# Patient Record
Sex: MALE | Race: WHITE | URBAN - NONMETROPOLITAN AREA
[De-identification: names, ages, dates, MRNs, and addresses within clinical notes are randomized per-mention and may not be internally consistent; named-entity substitution may affect disease eponyms.]

---

## 2022-02-15 ENCOUNTER — OFFICE VISIT (OUTPATIENT)
Dept: URBAN - NONMETROPOLITAN AREA CLINIC 19 | Facility: CLINIC | Age: 72
End: 2022-02-15
Payer: MEDICARE

## 2022-02-15 DIAGNOSIS — H35.3211 EXDTVE AGE-REL MCLR DEGN, RIGHT EYE, WITH ACTV CHRDL NEOVAS: ICD-10-CM

## 2022-02-15 DIAGNOSIS — E11.9 TYPE 2 DIABETES MELLITUS W/O COMPLICATION: ICD-10-CM

## 2022-02-15 DIAGNOSIS — H40.013 OPEN ANGLE WITH BORDERLINE FINDINGS, LOW RISK, BILATERAL: ICD-10-CM

## 2022-02-15 DIAGNOSIS — H43.813 VITREOUS DEGENERATION, BILATERAL: ICD-10-CM

## 2022-02-15 DIAGNOSIS — H35.3122 NEXDTVE AGE-RELATED MCLR DEGN, LEFT EYE, INTERMED DRY STAGE: ICD-10-CM

## 2022-02-15 PROCEDURE — 99204 OFFICE O/P NEW MOD 45 MIN: CPT | Performed by: OPHTHALMOLOGY

## 2022-02-15 ASSESSMENT — INTRAOCULAR PRESSURE
OS: 11
OD: 15

## 2022-02-15 ASSESSMENT — VISUAL ACUITY: OD: CF 1FT

## 2022-02-15 NOTE — IMPRESSION/PLAN
Impression: Vitreous degeneration, bilateral: H43.813. Plan: The patient was found to have a PVD. No signs of retinal tear or retinal detachment were  found on exam.  The mechanism of vitreous degeneration and PVD were discussed. The signs and symptoms of retinal tear and retinal detachment were discussed with the patient, and they were instructed to RCT ASAP if they develop.

## 2022-02-15 NOTE — IMPRESSION/PLAN
Impression: Open angle with borderline findings, low risk, bilateral: H40.013. Plan: The patient is diagnosed as a glaucoma suspect due to CDR . The exam findings relevant to this condition, the potential need for medical and/or surgical treatment, and  the need to monitor for development into definite glaucoma with clinical exams and diagnostic studies was emphasized to the patient. The patient was also educated that untreated glaucoma may lead to permanent vision loss and blindness.   All questions were answered and the patient appeared to understand

## 2022-02-15 NOTE — IMPRESSION/PLAN
Impression: Nexdtve age-related mclr degn, left eye, intermed dry stage: H35.3122. Plan: The patient has retinal findings consistent with dry macular degeneration OS,  I recommended using an Amsler grid daily to check vision, and to call office immediately if change is noted. AREDS vitamins were recommended to help slow the progression and lessen the risk of severe vision loss. Importance of compliance and regular follow-up appointments was emphasized. For follow-up and possible progression of macular degeneration, I have recommended an OCTm to be performed.

## 2022-02-15 NOTE — IMPRESSION/PLAN
Impression: Exdtve age-rel mclr degn, right eye, with actv chrdl neovas: H35.3344. Plan: For wet macular degeneration,  I recommended they continue care and treatment with Dr Menchaca for their wet ARMD.  I also recommend the patient continue  AREDS vitamins. Importance of compliance with recommendations and regular follow-up appointments was emphasized.

## 2022-02-15 NOTE — IMPRESSION/PLAN
Impression: Type 2 diabetes mellitus w/o complication: B83.8. Plan: I have explained to patient that there is no evidence of diabetic retinopathy in both eyes. I have stressed the importance of patient maintaining good blood sugar control in order to minimize the risk of the development of diabetic retinopathy, and we discussed the various mechanisms of vision loss associated with diabetic retinopathy. The patient understands the need for yearly retina exams, and the need to maintain medication compliance prescribed by their PCP. Patient will return for regular follow-up appointments with repeat dilation.

## 2022-03-31 ENCOUNTER — OFFICE VISIT (OUTPATIENT)
Dept: URBAN - METROPOLITAN AREA CLINIC 91 | Facility: CLINIC | Age: 72
End: 2022-03-31
Payer: MEDICARE

## 2022-03-31 DIAGNOSIS — H25.13 AGE-RELATED NUCLEAR CATARACT, BILATERAL: ICD-10-CM

## 2022-03-31 PROCEDURE — 92133 CPTRZD OPH DX IMG PST SGM ON: CPT | Performed by: OPHTHALMOLOGY

## 2022-03-31 PROCEDURE — 99213 OFFICE O/P EST LOW 20 MIN: CPT | Performed by: OPHTHALMOLOGY

## 2022-03-31 ASSESSMENT — INTRAOCULAR PRESSURE
OD: 12
OS: 13

## 2022-03-31 NOTE — IMPRESSION/PLAN
Impression: Open angle with borderline findings, low risk, bilateral: H40.013. Plan: The patient is diagnosed as a glaucoma suspect due to CDR, low risk. Patient denies FHx of glaucoma. He also has thick CCT, which we discussed slightly overestimates his IOP. We will continue to monitor him with periodic IOP checks, VF, and OCTg. The exam findings relevant to this condition, the potential need for medical and/or surgical treatment, and  the need to monitor for development into definite glaucoma with clinical exams and diagnostic studies was emphasized to the patient. The patient was also educated that untreated glaucoma may lead to permanent vision loss and blindness.   All questions were answered and the patient appeared to understand